# Patient Record
Sex: FEMALE | Race: WHITE | NOT HISPANIC OR LATINO
[De-identification: names, ages, dates, MRNs, and addresses within clinical notes are randomized per-mention and may not be internally consistent; named-entity substitution may affect disease eponyms.]

---

## 2021-11-09 PROBLEM — Z00.00 ENCOUNTER FOR PREVENTIVE HEALTH EXAMINATION: Status: ACTIVE | Noted: 2021-11-09

## 2021-11-10 ENCOUNTER — NON-APPOINTMENT (OUTPATIENT)
Age: 36
End: 2021-11-10

## 2021-11-11 ENCOUNTER — APPOINTMENT (OUTPATIENT)
Dept: UROLOGY | Facility: CLINIC | Age: 36
End: 2021-11-11
Payer: MEDICAID

## 2021-11-11 ENCOUNTER — TRANSCRIPTION ENCOUNTER (OUTPATIENT)
Age: 36
End: 2021-11-11

## 2021-11-11 VITALS
SYSTOLIC BLOOD PRESSURE: 109 MMHG | OXYGEN SATURATION: 100 % | DIASTOLIC BLOOD PRESSURE: 73 MMHG | HEART RATE: 67 BPM | TEMPERATURE: 97.9 F

## 2021-11-11 VITALS — BODY MASS INDEX: 20.8 KG/M2 | HEIGHT: 62 IN | WEIGHT: 113 LBS

## 2021-11-11 DIAGNOSIS — R31.21 ASYMPTOMATIC MICROSCOPIC HEMATURIA: ICD-10-CM

## 2021-11-11 LAB
BILIRUB UR QL STRIP: NORMAL
GLUCOSE UR-MCNC: NORMAL
HCG UR QL: 0.2 EU/DL
HGB UR QL STRIP.AUTO: NORMAL
KETONES UR-MCNC: NORMAL
LEUKOCYTE ESTERASE UR QL STRIP: NORMAL
NITRITE UR QL STRIP: NORMAL
PH UR STRIP: 5.5
PROT UR STRIP-MCNC: NORMAL
SP GR UR STRIP: 1.01

## 2021-11-11 PROCEDURE — 81003 URINALYSIS AUTO W/O SCOPE: CPT | Mod: QW

## 2021-11-11 PROCEDURE — 51798 US URINE CAPACITY MEASURE: CPT

## 2021-11-11 PROCEDURE — 99204 OFFICE O/P NEW MOD 45 MIN: CPT

## 2021-11-11 NOTE — REVIEW OF SYSTEMS
[see HPI] : see HPI [Joint Pain] : joint pain [Hot Flashes] : hot flashes [Easy Bleeding] : a tendency for easy bleeding [Easy Bruising] : a tendency for easy bruising [Negative] : Psychiatric

## 2021-11-16 ENCOUNTER — APPOINTMENT (OUTPATIENT)
Dept: UROLOGY | Facility: CLINIC | Age: 36
End: 2021-11-16
Payer: MEDICAID

## 2021-11-16 VITALS
TEMPERATURE: 97.6 F | OXYGEN SATURATION: 99 % | DIASTOLIC BLOOD PRESSURE: 76 MMHG | HEART RATE: 83 BPM | SYSTOLIC BLOOD PRESSURE: 127 MMHG

## 2021-11-16 LAB
APPEARANCE: CLEAR
BACTERIA UR CULT: NORMAL
BACTERIA: NEGATIVE
BILIRUBIN URINE: NEGATIVE
BLOOD URINE: NEGATIVE
COLOR: COLORLESS
GLUCOSE QUALITATIVE U: NEGATIVE
HYALINE CASTS: 0 /LPF
KETONES URINE: NEGATIVE
LEUKOCYTE ESTERASE URINE: NEGATIVE
MICROSCOPIC-UA: NORMAL
NITRITE URINE: NEGATIVE
PH URINE: 6.5
PROTEIN URINE: NEGATIVE
RED BLOOD CELLS URINE: 1 /HPF
SPECIFIC GRAVITY URINE: 1.01
SQUAMOUS EPITHELIAL CELLS: 0 /HPF
URINE CYTOLOGY: NORMAL
UROBILINOGEN URINE: NORMAL
WHITE BLOOD CELLS URINE: 0 /HPF

## 2021-11-16 PROCEDURE — 81003 URINALYSIS AUTO W/O SCOPE: CPT | Mod: QW

## 2021-11-16 PROCEDURE — 52000 CYSTOURETHROSCOPY: CPT

## 2021-11-16 PROCEDURE — 99214 OFFICE O/P EST MOD 30 MIN: CPT | Mod: 25

## 2021-11-16 NOTE — ASSESSMENT
[FreeTextEntry1] : Pt is a 35 yo F with intermittent gross hematuria and urinary urgency as well as a sensation of incomplete bladder emptying. Today's urine was sent for culture, cytology, and UA.  She will return for a cystoscopy and will need to undergo a CT Urogram, which I have ordered. \par \par Patient expressed understanding.

## 2021-11-16 NOTE — LETTER BODY
[Dear  ___] : Dear  [unfilled], [Consult Letter:] : I had the pleasure of evaluating your patient, [unfilled]. [Please see my note below.] : Please see my note below. [Consult Closing:] : Thank you very much for allowing me to participate in the care of this patient.  If you have any questions, please do not hesitate to contact me. [Sincerely,] : Sincerely, [FreeTextEntry3] : Dr. Melissa Sandy

## 2021-11-16 NOTE — HISTORY OF PRESENT ILLNESS
[FreeTextEntry1] : Pt is a 37 yo female  presenting today with episodes of gross hematuria for the past month as well as urinary frequency and a sensation of incomplete bladder emptying, referred by Dr. Beard. She believes her symptoms began after an incident with what "felt as an obstruction" which also presented with dysuria.  She currently voids q 30 minutes and suprapubic discomfort. Yesterday, she states her urine was brown. \par \par Pt states previous urine culture(s) at Dr. Beard's office were negative.  Denies f/c/v, and has no known hx of urolithiasis. \par \par Of note; Pt has back pain from  2 herniated disc -is followed by neurosurgeon. \par Not sexually active. \par \par Udip: negative \par PVR: 11 cc (to rule out incomplete bladder emptying)\par \par \par PMH: anemia, autoimmune disease (ITP) \par PSH: none \par FH: none\par SH: non-smoker, social alcohol, actor \par \par Habits: limits fluid intake\par \par Allergies: NKDA \par Meds: doxepin, propranolol, buspirone, meloxicam

## 2021-11-16 NOTE — ADDENDUM
[FreeTextEntry1] : A portion of this note was written by [Sarah Morales] on 11/11/2021 acting as a scribe for Dr. Sandy. \par \par I have personally reviewed the chart and agree that the record accurately reflects my personal performance of the history, physical exam, assessment, and plan.

## 2021-11-16 NOTE — PHYSICAL EXAM

## 2021-11-18 LAB
BACTERIA UR CULT: NORMAL
BILIRUB UR QL STRIP: NORMAL
CLARITY UR: CLEAR
COLLECTION METHOD: NORMAL
GLUCOSE UR-MCNC: NORMAL
HCG UR QL: 0.2 EU/DL
HGB UR QL STRIP.AUTO: NORMAL
KETONES UR-MCNC: NORMAL
LEUKOCYTE ESTERASE UR QL STRIP: NORMAL
NITRITE UR QL STRIP: NORMAL
PH UR STRIP: 5.5
PROT UR STRIP-MCNC: NORMAL
SP GR UR STRIP: 1.01

## 2021-11-30 ENCOUNTER — APPOINTMENT (OUTPATIENT)
Dept: UROLOGY | Facility: CLINIC | Age: 36
End: 2021-11-30
Payer: MEDICAID

## 2021-11-30 VITALS
OXYGEN SATURATION: 97 % | SYSTOLIC BLOOD PRESSURE: 110 MMHG | DIASTOLIC BLOOD PRESSURE: 75 MMHG | TEMPERATURE: 97.9 F | HEART RATE: 100 BPM

## 2021-11-30 LAB
BILIRUB UR QL STRIP: NORMAL
CLARITY UR: CLEAR
COLLECTION METHOD: NORMAL
GLUCOSE UR-MCNC: NORMAL
HCG UR QL: 0.2 EU/DL
HGB UR QL STRIP.AUTO: NORMAL
KETONES UR-MCNC: NORMAL
LEUKOCYTE ESTERASE UR QL STRIP: NORMAL
NITRITE UR QL STRIP: NORMAL
PH UR STRIP: 7
PROT UR STRIP-MCNC: NORMAL
SP GR UR STRIP: 1.01

## 2021-11-30 PROCEDURE — 51728 CYSTOMETROGRAM W/VP: CPT

## 2021-11-30 PROCEDURE — 51741 ELECTRO-UROFLOWMETRY FIRST: CPT

## 2021-11-30 PROCEDURE — 51784 ANAL/URINARY MUSCLE STUDY: CPT

## 2021-11-30 PROCEDURE — 99214 OFFICE O/P EST MOD 30 MIN: CPT | Mod: 25

## 2021-11-30 PROCEDURE — 51797 INTRAABDOMINAL PRESSURE TEST: CPT

## 2021-11-30 PROCEDURE — 81003 URINALYSIS AUTO W/O SCOPE: CPT | Mod: QW

## 2021-11-30 PROCEDURE — 51798 US URINE CAPACITY MEASURE: CPT | Mod: 59

## 2021-11-30 NOTE — PHYSICAL EXAM

## 2021-11-30 NOTE — ASSESSMENT
[FreeTextEntry1] : Pt is a 37 yo F with persistent gross hematuria and urinary urgency, sensation of incomplete bladder emptying, urgency/frequency.  Today's cystoscopy was unremarkable. Pt will schedule a telehealth visit on Thursday to discuss CT urogram results. Additionally, given her obstructive-like symptoms, she will f/u with me for UDS for further evaluation.  \par \par Patient expressed understanding.

## 2021-11-30 NOTE — ADDENDUM
[FreeTextEntry1] : A portion of this note was written by [Sarah Morales] on 11/16/2021 acting as a scribe for Dr. Sandy. \par \par I have personally reviewed the chart and agree that the record accurately reflects my personal performance of the history, physical exam, assessment, and plan.

## 2021-12-01 NOTE — ASSESSMENT
[FreeTextEntry1] : Pt is a 37 yo F with obstructive symptoms and a sensation of incomplete bladder emptying.  Today's urodynamics showed evidence of slow urine flow, a high detrusor pressure and mild dysynergia.  She informed me that she had an spinal epidural last Friday and over the last 24-48 hours her bladder symptoms seem to be slightly better. \par \par We discussed next steps, and I would like to wait to see if she has continued improvement from the epidural before intervening further.  If she is not better in 2 weeks, we discussed options including a trial of vaginal suppositories/pelvic PT vs a trial of flomax.   Pt will f/u with me in 2 weeks via telehealth visit to discuss her progress.  \par \par Additionally, uterine fibroids seen on CT -she will have records sent from Dr. Beard's office with fibroid dimension.  \par \par Patient expressed understanding.

## 2021-12-01 NOTE — ADDENDUM
[FreeTextEntry1] : A portion of this note was written by [Sarah Morales] on 11/30/2021 acting as a scribe for Dr. Sandy. \par \par I have personally reviewed the chart and agree that the record accurately reflects my personal performance of the history, physical exam, assessment, and plan.

## 2021-12-01 NOTE — HISTORY OF PRESENT ILLNESS
[FreeTextEntry1] : Pt is a 35 yo female  presenting with a 1 month history of gross hematuria. She returns today for urodynamics given her obstructive-like symptoms.  She also complains of urinary frequency, intermittent urethral pain, and sensation of incomplete bladder over the past year, referred by Dr. Beard. \par \par Full Hx: \par She believes her symptoms began after an incident with what felt as an obstruction which presented with dysuria - subsided after 2 months but symptoms progressively returned.  She voids q 30 minutes, has rare incontinence, mild dysuria,  and discomfort. Yesterday, she states her urine was brown. She reports slight interval voids improvement with meloxicam. Pt's past urine cultures at Dr. Beard's office ruled out infections.  Denies f/c/v, current/recent infection, and hx of urolithiasis. \par \par Of note; Pt has back pain from  2 herniated disc -is following with a neurosurgeon. \par Not sexually active. \par \par Udip: negative \par PVR: 11 cc (to rule out incomplete bladder emptying)\par After PVR check pt had the urge to void -pt voided 100 cc into hat. \par \par PMH: anemia, autoimmune disease (ITP) \par PSH: none \par FH: none\par SH: non-smoker, social alcohol, actor \par \par Habits: limits fluid intake\par \par Allergies: NKDA \par Meds: doxepin, propranolol, buspirone, meloxicam

## 2021-12-02 ENCOUNTER — NON-APPOINTMENT (OUTPATIENT)
Age: 36
End: 2021-12-02

## 2021-12-02 LAB — BACTERIA UR CULT: NORMAL

## 2021-12-16 ENCOUNTER — APPOINTMENT (OUTPATIENT)
Dept: UROLOGY | Facility: CLINIC | Age: 36
End: 2021-12-16
Payer: MEDICAID

## 2021-12-16 DIAGNOSIS — R33.9 RETENTION OF URINE, UNSPECIFIED: ICD-10-CM

## 2021-12-16 PROCEDURE — 99443: CPT

## 2021-12-16 RX ORDER — TAMSULOSIN HYDROCHLORIDE 0.4 MG/1
0.4 CAPSULE ORAL
Qty: 30 | Refills: 2 | Status: ACTIVE | COMMUNITY
Start: 2021-12-16 | End: 1900-01-01

## 2022-01-03 NOTE — ASSESSMENT
[FreeTextEntry1] : Pt is a 37 yo F with obstructive symptoms and a sensation of incomplete bladder emptying. Today, she presented for a telehealth visit and reports no improvement following the spinal epidural.  I have advised she undergo a formal transvaginal US to assess fibroid size to see if this could be a contributing factor. \par \par For the time being, she is interested in trying Flomax for her obstructive symptoms.   I've placed her on 0.4 mg of flomax and she will schedule a telehealth visit to discuss US results and symptom progression after the holidays.\par  \par We also discussed pursuing a trial of vaginal suppositories/pelvic PT if there are no improvements with flomax.  \par \par \par Patient expressed understanding.

## 2022-01-03 NOTE — ADDENDUM
[FreeTextEntry1] : A portion of this note was written by [Sarah Morales] on 12/16/2021 acting as a scribe for Dr. Sandy. \par \par I have personally reviewed the chart and agree that the record accurately reflects my personal performance of the history, physical exam, assessment, and plan.

## 2022-01-03 NOTE — HISTORY OF PRESENT ILLNESS
[Other Location: e.g. School (Enter Location, City,State)___] : at [unfilled], at the time of the visit. [FreeTextEntry1] : Pt is a 35 yo female  presenting with a 1 month history of gross hematuria. She presented today for a telehealth visit to discuss her symptoms after epidural as well as the fibroid dimensions on transvaginal US performed at her GYN.  Pt states that despite the epidural, she has no improvement in her urinary symptoms. \par \par She also complains of urinary frequency, intermittent urethral pain, and sensation of incomplete bladder over the past year, initially referred by Dr. Beard.   With respect to her fibroids, the size was not provided.  \par \par Full Hx: \par She believes her symptoms began after an incident with what felt as an obstruction which presented with dysuria - subsided after 2 months but symptoms progressively returned.  She voids q 30 minutes, has rare incontinence, mild dysuria,  and discomfort. Yesterday, she states her urine was brown. She reports slight interval voids improvement with meloxicam. Pt's past urine cultures at Dr. Beard's office ruled out infections.  Denies f/c/v, current/recent infection, and hx of urolithiasis. \par \par Of note; Pt has back pain from  2 herniated disc -is following with a neurosurgeon. \par Not sexually active. \par \par Udip: negative \par PVR: 11 cc (to rule out incomplete bladder emptying)\par After PVR check pt had the urge to void -pt voided 100 cc into hat. \par \par PMH: anemia, autoimmune disease (ITP) \par PSH: none \par FH: none\par SH: non-smoker, social alcohol, actor \par \par Habits: limits fluid intake\par \par Allergies: NKDA \par Meds: doxepin, propranolol, buspirone, meloxicam

## 2022-01-06 ENCOUNTER — APPOINTMENT (OUTPATIENT)
Dept: UROLOGY | Facility: CLINIC | Age: 37
End: 2022-01-06

## 2022-01-12 ENCOUNTER — APPOINTMENT (OUTPATIENT)
Dept: UROLOGY | Facility: CLINIC | Age: 37
End: 2022-01-12
Payer: MEDICAID

## 2022-01-12 VITALS
OXYGEN SATURATION: 96 % | HEART RATE: 81 BPM | SYSTOLIC BLOOD PRESSURE: 108 MMHG | TEMPERATURE: 97.9 F | DIASTOLIC BLOOD PRESSURE: 74 MMHG

## 2022-01-12 PROCEDURE — 51798 US URINE CAPACITY MEASURE: CPT

## 2022-01-12 PROCEDURE — 99214 OFFICE O/P EST MOD 30 MIN: CPT

## 2022-01-12 PROCEDURE — 81003 URINALYSIS AUTO W/O SCOPE: CPT | Mod: QW

## 2022-01-12 RX ORDER — DIAZEPAM 10 MG/1
10 TABLET ORAL
Qty: 30 | Refills: 0 | Status: ACTIVE | COMMUNITY
Start: 2022-01-12 | End: 1900-01-01

## 2022-01-18 NOTE — HISTORY OF PRESENT ILLNESS
[Other Location: e.g. School (Enter Location, City,State)___] : at [unfilled], at the time of the visit. [FreeTextEntry1] : Pt is a 37 yo female  presenting for follow-up.   She has a history of incomplete bladder emptying/slow flow, s/p UDS which demonstrated dyssynergia and interrupted flow.   She is willing to try PFPT but has not yet started.  She tried Flomax for 1 month and saw slight increase in urine stream.  However, she reports overall "dryness" as a side effect.   She has not seen significant improvement in her back pain with recent epidural.  \par \par Of note, she is awaiting endometrial bx results after pelvic US demonstrated endometrial polyp.   \par Udip: negative \par \par Recent Hx: She presented today for a telehealth visit to discuss her symptoms after epidural as well as the fibroid dimensions on transvaginal US performed at her GYN.  Pt states that despite the epidural, she has no improvement in her urinary symptoms. \par \par Full Hx: \par She believes her symptoms began after an incident with what felt as an obstruction which presented with dysuria - subsided after 2 months but symptoms progressively returned.  She voids q 30 minutes, has rare incontinence, mild dysuria,  and discomfort. Yesterday, she states her urine was brown. She reports slight interval voids improvement with meloxicam. Pt's past urine cultures at Dr. Beard's office ruled out infections.  Denies f/c/v, current/recent infection, and hx of urolithiasis. \par \par Of note; Pt has back pain from  2 herniated disc -is following with a neurosurgeon. \par Not sexually active. \par \par PVR: 11 cc (to rule out incomplete bladder emptying)\par After PVR check pt had the urge to void -pt voided 100 cc into hat. \par \par PMH: anemia, autoimmune disease (ITP) \par PSH: none \par FH: none\par SH: non-smoker, social alcohol, actor \par \par Habits: limits fluid intake\par \par Allergies: NKDA \par Meds: doxepin, propranolol, buspirone, meloxicam

## 2022-01-18 NOTE — ADDENDUM
[FreeTextEntry1] : A portion of this note was written by [Sarah Morales] on 01/12/2022 acting as a scribe for Dr. Sandy. \par \par I have personally reviewed the chart and agree that the record accurately reflects my personal performance of the history, physical exam, assessment, and plan.

## 2022-01-18 NOTE — ASSESSMENT
[FreeTextEntry1] : Pt is a 37 yo F with obstructive symptoms and a sensation of incomplete bladder emptying. US results showed evidence of an endometrial polyp -pt is awaiting biopsy results by gyn.   For her bladder symptoms, I've suggested she try pelvic floor PT.  In addition, I have prescribed vaginal suppositories, (valium/baclofen) to use nightly for now.  Pt will stop flomax as she is experiencing side effects.  She will fu with me in 6-8 weeks after a trial of pelvic floor PT. \par \par \par Patient expressed understanding.

## 2022-03-02 ENCOUNTER — APPOINTMENT (OUTPATIENT)
Dept: UROLOGY | Facility: CLINIC | Age: 37
End: 2022-03-02

## 2022-03-06 ENCOUNTER — TRANSCRIPTION ENCOUNTER (OUTPATIENT)
Age: 37
End: 2022-03-06

## 2022-03-07 ENCOUNTER — OUTPATIENT (OUTPATIENT)
Dept: OUTPATIENT SERVICES | Facility: HOSPITAL | Age: 37
LOS: 1 days | Discharge: HOME | End: 2022-03-07
Payer: MEDICAID

## 2022-03-07 ENCOUNTER — RESULT REVIEW (OUTPATIENT)
Age: 37
End: 2022-03-07

## 2022-03-07 VITALS
WEIGHT: 102.07 LBS | HEART RATE: 87 BPM | SYSTOLIC BLOOD PRESSURE: 114 MMHG | RESPIRATION RATE: 16 BRPM | TEMPERATURE: 98 F | HEIGHT: 62 IN | DIASTOLIC BLOOD PRESSURE: 72 MMHG | OXYGEN SATURATION: 98 %

## 2022-03-07 VITALS
DIASTOLIC BLOOD PRESSURE: 75 MMHG | SYSTOLIC BLOOD PRESSURE: 111 MMHG | RESPIRATION RATE: 16 BRPM | HEART RATE: 85 BPM | OXYGEN SATURATION: 99 % | TEMPERATURE: 98 F

## 2022-03-07 DIAGNOSIS — Z98.890 OTHER SPECIFIED POSTPROCEDURAL STATES: Chronic | ICD-10-CM

## 2022-03-07 PROCEDURE — 88305 TISSUE EXAM BY PATHOLOGIST: CPT | Mod: 26

## 2022-03-07 DEVICE — MYOSURE TISSUE REMOVAL DEVICE XL: Type: IMPLANTABLE DEVICE | Status: FUNCTIONAL

## 2022-03-07 RX ORDER — SODIUM CHLORIDE 9 MG/ML
1000 INJECTION, SOLUTION INTRAVENOUS
Refills: 0 | Status: DISCONTINUED | OUTPATIENT
Start: 2022-03-07 | End: 2022-03-07

## 2022-03-07 RX ORDER — PROPRANOLOL HCL 160 MG
0 CAPSULE, EXTENDED RELEASE 24HR ORAL
Qty: 0 | Refills: 0 | DISCHARGE

## 2022-03-07 RX ORDER — ACETAMINOPHEN 500 MG
1000 TABLET ORAL EVERY 6 HOURS
Refills: 0 | Status: DISCONTINUED | OUTPATIENT
Start: 2022-03-07 | End: 2022-03-07

## 2022-03-07 RX ORDER — ONDANSETRON 8 MG/1
8 TABLET, FILM COATED ORAL EVERY 8 HOURS
Refills: 0 | Status: DISCONTINUED | OUTPATIENT
Start: 2022-03-07 | End: 2022-03-07

## 2022-03-07 RX ORDER — TIZANIDINE 4 MG/1
0 TABLET ORAL
Qty: 0 | Refills: 0 | DISCHARGE

## 2022-03-07 RX ORDER — SIMETHICONE 80 MG/1
80 TABLET, CHEWABLE ORAL EVERY 6 HOURS
Refills: 0 | Status: DISCONTINUED | OUTPATIENT
Start: 2022-03-07 | End: 2022-03-07

## 2022-03-07 RX ORDER — ONDANSETRON 8 MG/1
4 TABLET, FILM COATED ORAL ONCE
Refills: 0 | Status: DISCONTINUED | OUTPATIENT
Start: 2022-03-07 | End: 2022-03-07

## 2022-03-07 RX ORDER — HYDROMORPHONE HYDROCHLORIDE 2 MG/ML
0.5 INJECTION INTRAMUSCULAR; INTRAVENOUS; SUBCUTANEOUS
Refills: 0 | Status: DISCONTINUED | OUTPATIENT
Start: 2022-03-07 | End: 2022-03-07

## 2022-03-07 RX ORDER — ACETAMINOPHEN 500 MG
650 TABLET ORAL ONCE
Refills: 0 | Status: COMPLETED | OUTPATIENT
Start: 2022-03-07 | End: 2022-03-07

## 2022-03-07 RX ORDER — ACETAMINOPHEN 500 MG
1000 TABLET ORAL ONCE
Refills: 0 | Status: DISCONTINUED | OUTPATIENT
Start: 2022-03-07 | End: 2022-03-07

## 2022-03-07 RX ORDER — ZOLPIDEM TARTRATE 10 MG/1
0 TABLET ORAL
Qty: 0 | Refills: 0 | DISCHARGE

## 2022-03-07 RX ORDER — MELOXICAM 15 MG/1
1 TABLET ORAL
Qty: 0 | Refills: 0 | DISCHARGE

## 2022-03-07 RX ADMIN — Medication 650 MILLIGRAM(S): at 09:59

## 2022-03-07 NOTE — ASU DISCHARGE PLAN (ADULT/PEDIATRIC) - NS MD DC FALL RISK RISK
For information on Fall & Injury Prevention, visit: [email or web address] OR  [email or web address] OR  https://www.cdc.gov/steadi/patient.html

## 2022-03-07 NOTE — ASU DISCHARGE PLAN (ADULT/PEDIATRIC) - CARE PROVIDER_API CALL
Joelle Campuzano)  [facility]  [address] [id number]  Phone: [phone]  Fax: [phone]  Follow Up Time:

## 2022-03-08 LAB — SURGICAL PATHOLOGY STUDY: SIGNIFICANT CHANGE UP

## (undated) DEVICE — DRAPE TOWEL BLUE 17" X 24"

## (undated) DEVICE — SLV COMPRESSION KNEE MED

## (undated) DEVICE — POSITIONER STRAP ARMBOARD 1.5X32" DISP

## (undated) DEVICE — MYOSURE TISSUE REMOVAL DEVICE

## (undated) DEVICE — TUBING STRYKER HYSTEROSCOPY INFLOW OUTFLOW

## (undated) DEVICE — PACK D&C

## (undated) DEVICE — SOL INJ NS 0.9% 1000ML

## (undated) DEVICE — FLUENT FMS PROCEDURE KIT

## (undated) DEVICE — WARMING BLANKET UPPER ADULT

## (undated) DEVICE — GLV 6.5 PROTEXIS (WHITE)

## (undated) DEVICE — SOL IRR BAG NS 0.9% 3000ML

## (undated) DEVICE — MYOSURE SCOPE SEAL

## (undated) DEVICE — DRSG PAD SANITARY OB

## (undated) DEVICE — MYOSURE SOCK

## (undated) DEVICE — DRAPE IRRIGATION POUCH 19X23"